# Patient Record
Sex: MALE | Race: WHITE
[De-identification: names, ages, dates, MRNs, and addresses within clinical notes are randomized per-mention and may not be internally consistent; named-entity substitution may affect disease eponyms.]

---

## 2023-02-16 ENCOUNTER — HOSPITAL ENCOUNTER (EMERGENCY)
Dept: HOSPITAL 54 - ER | Age: 38
Discharge: HOME | End: 2023-02-16
Payer: MEDICAID

## 2023-02-16 VITALS — SYSTOLIC BLOOD PRESSURE: 125 MMHG | DIASTOLIC BLOOD PRESSURE: 80 MMHG

## 2023-02-16 VITALS — HEIGHT: 66 IN | WEIGHT: 133 LBS | BODY MASS INDEX: 21.38 KG/M2

## 2023-02-16 DIAGNOSIS — F10.129: ICD-10-CM

## 2023-02-16 DIAGNOSIS — Z20.822: ICD-10-CM

## 2023-02-16 DIAGNOSIS — Z59.2: ICD-10-CM

## 2023-02-16 DIAGNOSIS — S61.512A: Primary | ICD-10-CM
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
## 2023-02-16 DIAGNOSIS — F43.23: ICD-10-CM
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
## 2023-02-16 LAB
ALBUMIN SERPL BCP-MCNC: 4.3 G/DL (ref 3.4–5)
ALP SERPL-CCNC: 123 U/L (ref 46–116)
ALT SERPL W P-5'-P-CCNC: 30 U/L (ref 12–78)
APAP SERPL-MCNC: < 10 UG/ML (ref 10–30)
AST SERPL W P-5'-P-CCNC: 28 U/L (ref 15–37)
BASOPHILS # BLD AUTO: 0.1 K/UL (ref 0–0.2)
BASOPHILS NFR BLD AUTO: 0.9 % (ref 0–2)
BILIRUB DIRECT SERPL-MCNC: 0.2 MG/DL (ref 0–0.2)
BILIRUB SERPL-MCNC: 0.4 MG/DL (ref 0.2–1)
BILIRUB UR QL STRIP: NEGATIVE
BUN SERPL-MCNC: 10 MG/DL (ref 7–18)
CALCIUM SERPL-MCNC: 8.6 MG/DL (ref 8.5–10.1)
CHLORIDE SERPL-SCNC: 109 MMOL/L (ref 98–107)
CO2 SERPL-SCNC: 28 MMOL/L (ref 21–32)
COLOR UR: (no result)
CREAT SERPL-MCNC: 0.9 MG/DL (ref 0.6–1.3)
EOSINOPHIL NFR BLD AUTO: 2.7 % (ref 0–6)
ETHANOL SERPL-MCNC: 263 MG/DL (ref 0–0)
GLUCOSE SERPL-MCNC: 120 MG/DL (ref 74–106)
GLUCOSE UR STRIP-MCNC: NEGATIVE MG/DL
HCT VFR BLD AUTO: 48 % (ref 39–51)
HGB BLD-MCNC: 16.4 G/DL (ref 13.5–17.5)
LEUKOCYTE ESTERASE UR QL STRIP: NEGATIVE
LYMPHOCYTES NFR BLD AUTO: 3.6 K/UL (ref 0.8–4.8)
LYMPHOCYTES NFR BLD AUTO: 34.4 % (ref 20–44)
MCHC RBC AUTO-ENTMCNC: 34 G/DL (ref 31–36)
MCV RBC AUTO: 95 FL (ref 80–96)
MONOCYTES NFR BLD AUTO: 1 K/UL (ref 0.1–1.3)
MONOCYTES NFR BLD AUTO: 9.6 % (ref 2–12)
NEUTROPHILS # BLD AUTO: 5.5 K/UL (ref 1.8–8.9)
NEUTROPHILS NFR BLD AUTO: 52.4 % (ref 43–81)
NITRITE UR QL STRIP: NEGATIVE
PH UR STRIP: 6 [PH] (ref 5–8)
PLATELET # BLD AUTO: 273 K/UL (ref 150–450)
POTASSIUM SERPL-SCNC: 4.3 MMOL/L (ref 3.5–5.1)
PROT SERPL-MCNC: 8.1 G/DL (ref 6.4–8.2)
PROT UR QL STRIP: NEGATIVE MG/DL
RBC # BLD AUTO: 5.1 MIL/UL (ref 4.5–6)
RBC #/AREA URNS HPF: (no result) /HPF (ref 0–2)
SODIUM SERPL-SCNC: 145 MMOL/L (ref 136–145)
UROBILINOGEN UR STRIP-MCNC: 0.2 EU/DL
WBC #/AREA URNS HPF: (no result) /HPF (ref 0–3)
WBC NRBC COR # BLD AUTO: 10.6 K/UL (ref 4.3–11)

## 2023-02-16 PROCEDURE — 80307 DRUG TEST PRSMV CHEM ANLYZR: CPT

## 2023-02-16 PROCEDURE — G0480 DRUG TEST DEF 1-7 CLASSES: HCPCS

## 2023-02-16 PROCEDURE — 99285 EMERGENCY DEPT VISIT HI MDM: CPT

## 2023-02-16 PROCEDURE — 81001 URINALYSIS AUTO W/SCOPE: CPT

## 2023-02-16 PROCEDURE — C9803 HOPD COVID-19 SPEC COLLECT: HCPCS

## 2023-02-16 PROCEDURE — 80320 DRUG SCREEN QUANTALCOHOLS: CPT

## 2023-02-16 PROCEDURE — 90715 TDAP VACCINE 7 YRS/> IM: CPT

## 2023-02-16 PROCEDURE — 80076 HEPATIC FUNCTION PANEL: CPT

## 2023-02-16 PROCEDURE — 36415 COLL VENOUS BLD VENIPUNCTURE: CPT

## 2023-02-16 PROCEDURE — 87426 SARSCOV CORONAVIRUS AG IA: CPT

## 2023-02-16 PROCEDURE — 85025 COMPLETE CBC W/AUTO DIFF WBC: CPT

## 2023-02-16 PROCEDURE — 80143 DRUG ASSAY ACETAMINOPHEN: CPT

## 2023-02-16 PROCEDURE — 90471 IMMUNIZATION ADMIN: CPT

## 2023-02-16 PROCEDURE — 80048 BASIC METABOLIC PNL TOTAL CA: CPT

## 2023-02-16 PROCEDURE — 12001 RPR S/N/AX/GEN/TRNK 2.5CM/<: CPT

## 2023-02-16 SDOH — ECONOMIC STABILITY - HOUSING INSECURITY: DISCORD WITH NEIGHBORS, LODGERS AND LANDLORD: Z59.2

## 2023-02-16 NOTE — NUR
-------------------------------------------------------------------------------

            *** Note bhavikone in EDM - 02/16/23 at 0520 by JI ***             

-------------------------------------------------------------------------------

[id number] & [employer] FOR 5150 HOLD DTS MULTIPLE SELF INFLICTED LACS TO [facility]. UNKNOWN 
TDAP. PT A/O;3 French SPEAKING. TOLERATING R/A WELL WITH NO RESP DISTRESS. PT 
CHANGED IN GOWN, BELONGINGS PLACED IN LOCKER, AND WANDED BY SECURITY . SAFETY 
MEASURES IN PLACE. SITTER WITHIN SIGHT.

## 2023-02-16 NOTE — NUR
PT SEEN BY DR. LYN; PER MD, NEEDS SS TO SEE PT TO DETERMINE OUTSIDE/SOCIAL 
SUPPORT. CALLED KADEEM FROM SS, WILL SEE PT.

## 2023-02-16 NOTE — NUR
PT'S FRIEND COMING FOR . DR. MCKEON OK TO BREAK HOLD WITH FRIEND'S 
SUPERVISION PT PROVIDED WITH MENTAL HEALTH RESOURCES
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
## 2023-02-16 NOTE — NUR
[id number] & [employer] FOR 5150 HOLD DTS MULTIPLE SELF INFLICTED LACS TO [facility]. UNKNOWN 
TDAP. PT A/O;3 Yi SPEAKING. TOLERATING R/A WELL WITH NO RESP DISTRESS. PT 
CHANGED IN GOWN, BELONGINGS PLACED IN LOCKER, AND WANDED BY SECURITY . SAFETY 
MEASURES IN PLACE. SITTER WITHIN SIGHT.

## 2023-02-16 NOTE — NUR
AAO, appropriate to simple questions. GCS-15  NO obvious distress NO acute 
changes. Cleared By Psych for discharge and SW provided resources. AMBULATORY 
to BR gait even and steady ABLE to tolerate food and po fluid. For discharge - 
Patient discharged to home in stable condition. Written and verbal after care 
instructions given. Patient verbalizes understanding of instruction.